# Patient Record
Sex: FEMALE | Race: WHITE | HISPANIC OR LATINO | URBAN - METROPOLITAN AREA
[De-identification: names, ages, dates, MRNs, and addresses within clinical notes are randomized per-mention and may not be internally consistent; named-entity substitution may affect disease eponyms.]

---

## 2022-02-22 ENCOUNTER — APPOINTMENT (RX ONLY)
Dept: URBAN - METROPOLITAN AREA CLINIC 119 | Facility: CLINIC | Age: 27
Setting detail: DERMATOLOGY
End: 2022-02-22

## 2022-02-22 DIAGNOSIS — L81.0 POSTINFLAMMATORY HYPERPIGMENTATION: ICD-10-CM

## 2022-02-22 DIAGNOSIS — D22 MELANOCYTIC NEVI: ICD-10-CM

## 2022-02-22 PROBLEM — D22.5 MELANOCYTIC NEVI OF TRUNK: Status: ACTIVE | Noted: 2022-02-22

## 2022-02-22 PROBLEM — D22.39 MELANOCYTIC NEVI OF OTHER PARTS OF FACE: Status: ACTIVE | Noted: 2022-02-22

## 2022-02-22 PROCEDURE — ? OBSERVATION

## 2022-02-22 PROCEDURE — ? PRESCRIPTION

## 2022-02-22 PROCEDURE — ? COUNSELING

## 2022-02-22 PROCEDURE — 99204 OFFICE O/P NEW MOD 45 MIN: CPT

## 2022-02-22 RX ORDER — PIMECROLIMUS 10 MG/G
CREAM TOPICAL BID
Qty: 100 | Refills: 4 | Status: ERX | COMMUNITY
Start: 2022-02-22

## 2022-02-22 RX ORDER — HYDROQUINONE 4 %
CREAM (GRAM) TOPICAL BID
Qty: 1 | Refills: 4 | Status: ERX | COMMUNITY
Start: 2022-02-22

## 2022-02-22 RX ADMIN — PIMECROLIMUS: 10 CREAM TOPICAL at 00:00

## 2022-02-22 RX ADMIN — Medication: at 00:00

## 2022-02-22 ASSESSMENT — LOCATION SIMPLE DESCRIPTION DERM
LOCATION SIMPLE: RIGHT CHEEK
LOCATION SIMPLE: LEFT CHEEK
LOCATION SIMPLE: ABDOMEN

## 2022-02-22 ASSESSMENT — LOCATION DETAILED DESCRIPTION DERM
LOCATION DETAILED: LEFT LATERAL ABDOMEN
LOCATION DETAILED: PERIUMBILICAL SKIN
LOCATION DETAILED: RIGHT CENTRAL MALAR CHEEK
LOCATION DETAILED: LEFT CENTRAL MALAR CHEEK

## 2022-02-22 ASSESSMENT — LOCATION ZONE DERM
LOCATION ZONE: TRUNK
LOCATION ZONE: FACE

## 2022-02-22 NOTE — PROCEDURE: COUNSELING
Patient Specific Counseling (Will Not Stick From Patient To Patient): _________________\\nStart Elidel cream TWICE a day to affected areas. \\n\\nStart Hydroquinone 4% cream TWICE a day to a certain area to see if it is working. If you notice an improvement, start using on other areas. \\n\\nAvoid sun exposure to areas. Use SPF 30+ every AM and repeat every 2 hours if in the sun. \\n\\nUse only fragrance-free products, including:\\n- Fragrance-free soap/ cleanser such as Dove UNSCENTED for sensitive skin Bar soap (not liquid)\\n- Fragrance-free detergent such as All Free & Clear or Tide FREE\\n- Fragrance free fabric softener such as none or Bounce FREE\\n- Fragrance-free dryer sheets such as none or Bounce FREE\\n- Fragrance-free moisturizing creams such as CeraVe cream\\n\\nFollow up in 3 months. \\n\\nDr. Barbie Rocha
Detail Level: Detailed
Detail Level: Generalized

## 2022-06-14 ENCOUNTER — APPOINTMENT (RX ONLY)
Dept: URBAN - METROPOLITAN AREA CLINIC 119 | Facility: CLINIC | Age: 27
Setting detail: DERMATOLOGY
End: 2022-06-14

## 2022-06-14 DIAGNOSIS — L81.0 POSTINFLAMMATORY HYPERPIGMENTATION: ICD-10-CM

## 2022-06-14 PROCEDURE — 99213 OFFICE O/P EST LOW 20 MIN: CPT

## 2022-06-14 PROCEDURE — ? COUNSELING

## 2022-06-14 NOTE — PROCEDURE: COUNSELING
Patient Specific Counseling (Will Not Stick From Patient To Patient): _________________\\n\\nStart Hydroquinone 4% cream TWICE a day to a certain area to see if it is working. If you notice an improvement, start using on other areas. \\n\\nStart Elidel cream TWICE a day over top to affected areas. \\n\\nAvoid ALL sun exposure to areas (I know this is difficult). Use SPF 30+ every AM and repeat every 2 hours if in the sun. \\n\\nUse only fragrance-free products, including:\\n- Fragrance-free soap/ cleanser such as Dove UNSCENTED for sensitive skin Bar soap (not liquid)\\n- Fragrance-free detergent such as All Free & Clear or Tide FREE\\n- Fragrance free fabric softener such as none or Bounce FREE\\n- Fragrance-free dryer sheets such as none or Bounce FREE\\n- Fragrance-free moisturizing creams such as CeraVe cream\\n\\nFollow up in 4-6 months. \\n\\nDr. Rimma Trammell
Detail Level: Detailed